# Patient Record
Sex: FEMALE | Race: WHITE | NOT HISPANIC OR LATINO | Employment: PART TIME | URBAN - NONMETROPOLITAN AREA
[De-identification: names, ages, dates, MRNs, and addresses within clinical notes are randomized per-mention and may not be internally consistent; named-entity substitution may affect disease eponyms.]

---

## 2024-01-10 ENCOUNTER — OFFICE VISIT (OUTPATIENT)
Dept: OBSTETRICS AND GYNECOLOGY | Facility: CLINIC | Age: 24
End: 2024-01-10
Payer: COMMERCIAL

## 2024-01-10 VITALS
DIASTOLIC BLOOD PRESSURE: 62 MMHG | SYSTOLIC BLOOD PRESSURE: 120 MMHG | WEIGHT: 151 LBS | BODY MASS INDEX: 27.79 KG/M2 | HEIGHT: 62 IN

## 2024-01-10 DIAGNOSIS — N94.6 DYSMENORRHEA: Primary | ICD-10-CM

## 2024-01-10 NOTE — PROGRESS NOTES
"GYN Office Visit    Subjective   Chief Complaint   Patient presents with    Fibroids     Possible uterine fibroids      Slava Barclay is a 23 y.o.  presenting to be evaluated for painful menses and a possible history of uterine fibroids. She reports being seen at an outside ER in approximately  for an unrelated concern. An MRI was performed and the ER provider informed her that she had uterine fibroids. However, she subsequently reviewed her records and did not see any mention of the fibroids. She has been concerned about whether or not she has fibroids since then and would like to be evaluated for them. Regarding her menstrual history, she reports monthly cycles with four days of bleeding that is not overly heavy, but associated with severe cramping/ pain. She is not on any form of contraception and she has never been sexually active. She is not interested in starting any form of hormonal contraception and is mostly concerned with ruling out any GYN pathology.    OB Hx: G0  Pap smear: never  Mammogram: N/A  Colonoscopy: N/A  DEXA Scan: N/A    History reviewed. No pertinent past medical history.    Past Surgical History:   Procedure Laterality Date    HEMORROIDECTOMY       History reviewed. No pertinent family history.     Social History     Tobacco Use    Smoking status: Never   Substance Use Topics    Drug use: Never     No Known Allergies    No current outpatient medications on file prior to visit.     No current facility-administered medications on file prior to visit.     Social History    Tobacco Use      Smoking status: Never      Smokeless tobacco: Not on file       Objective   /62   Ht 157.5 cm (62\")   Wt 68.5 kg (151 lb)   BMI 27.62 kg/m²     Physical Exam:  General Appearance: alert, interactive, and NAD       Medical Decision Making:  I reviewed outside CT A/P and pelvic US reports from 2022 and 2022, respectively. Both reports indicate that the uterus was normal in appearance and " do not mention uterine fibroids.    CT A/P 3/5/22  FINDINGS: There is no free intraperitoneal air. Bowel loops are normal in appearance as is the gallbladder. Minor focal fatty infiltration of the liver near the falciform ligament. Spleen, pancreas, and adrenal glands are unremarkable. Kidneys and   ureters are normal as is the bladder. Uterus and ovaries are unremarkable.     Trace free fluid in the pelvis, considered physiologic. No adenopathy. Aorta and mesenteric vasculature are unremarkable. Lung bases are clear. No acute bone abnormality.     IMPRESSION:   1. No abdominal or pelvic abnormality.     US Pelvis 4/8/22  FINDINGS: Transabdominal pelvic ultrasound performed. The uterus demonstrates normal size and echogenicity measuring 7.7 cm x 4.6 cm x 3.8cm. The echogenic endometrial stripe measures 12 mm.     The ovaries demonstrate normal size and echogenicity. The right ovary measures 4.3 cm x 3.3 cm x 1.9 cm.  The left ovary measures 3.6 cm x 3.1 cm x 2.5 cm.     No adnexal masses or fluid collections are identified.     CONCLUSION:  Negative examination. No abnormality is detected on transabdominal pelvic ultrasound.     Independent interpretation of tests:  TVUS performed today -   Anteverted uterus measuring 7.5 x 5.3 x 4.0 cm without any masses seen. The endometrium measures up to 18.1 mm and is somewhat heterogeneous at the fundus. The bilateral ovaries are normal in appearance with normal vascular flow. No adnexal masses seen. Small amount of free fluid present in the cul de sac.     Assessment & Plan      Diagnosis Plan   1. Dysmenorrhea  US Non-ob Transvaginal         Medication Management: We discussed the option of hormonal contraception for management of dysmenorrhea. Slava feels her symptoms are currently manageable and would prefer not to start medication.    Procedures Performed: None    We reviewed Slava's history and ultrasound findings, which are overall benign. The endometrium is somewhat  thickened and heterogeneous but is likely a product of the late phase of her current cycle. She does not have concerns for heavy menses. Her current bleeding profile/ dysmenorrhea is acceptable to her and she would prefer not to start hormonal contraception.     We discussed the need for Pap screening - she will return for an annual exam.    RTC for annual with Pap.    Radha oRsa MD  Obstetrics and Gynecology  [facility]